# Patient Record
Sex: MALE | Race: BLACK OR AFRICAN AMERICAN | NOT HISPANIC OR LATINO | URBAN - METROPOLITAN AREA
[De-identification: names, ages, dates, MRNs, and addresses within clinical notes are randomized per-mention and may not be internally consistent; named-entity substitution may affect disease eponyms.]

---

## 2017-08-02 ENCOUNTER — HISTORICAL (OUTPATIENT)
Dept: LAB | Facility: HOSPITAL | Age: 22
End: 2017-08-02

## 2022-01-04 ENCOUNTER — PATIENT OUTREACH (OUTPATIENT)
Dept: EMERGENCY MEDICINE | Facility: HOSPITAL | Age: 27
End: 2022-01-04

## 2022-02-01 ENCOUNTER — PATIENT OUTREACH (OUTPATIENT)
Dept: EMERGENCY MEDICINE | Facility: HOSPITAL | Age: 27
End: 2022-02-01

## 2022-04-04 ENCOUNTER — PATIENT OUTREACH (OUTPATIENT)
Dept: EMERGENCY MEDICINE | Facility: HOSPITAL | Age: 27
End: 2022-04-04

## 2022-05-02 ENCOUNTER — PATIENT OUTREACH (OUTPATIENT)
Dept: EMERGENCY MEDICINE | Facility: HOSPITAL | Age: 27
End: 2022-05-02
Payer: MEDICAID

## 2022-06-01 ENCOUNTER — PATIENT OUTREACH (OUTPATIENT)
Dept: EMERGENCY MEDICINE | Facility: HOSPITAL | Age: 27
End: 2022-06-01
Payer: MEDICAID

## 2022-06-02 NOTE — PROGRESS NOTES
Spoke to pt for f/u call, doing well, no compliant. Stressed and advised to pt importance of benefits of pcp and all providers and ancillary care and to call and obtain f/u appt and offer pt to assist with obtaining pcp appt to est care. Pt reports he will do on his own and that he has the list of providers that was given to him from last visit. Stressed importance of obtain dentist for oral care for preventive care. Advised pt to utilize ucc for non emergency issues when pcp is not available. Pt voices understanding to instructions given and appreciation.     Appointments   Follow-Up Appt Scheduled :   No   Follow-Up Appointment Status :   Has not had opportunity to call for appointment   PCP Visit Within Year :   No   Follow-Up Specialist Appt Scheduled :   No

## 2022-09-27 ENCOUNTER — PATIENT OUTREACH (OUTPATIENT)
Dept: EMERGENCY MEDICINE | Facility: HOSPITAL | Age: 27
End: 2022-09-27
Payer: MEDICAID

## 2022-11-01 ENCOUNTER — HOSPITAL ENCOUNTER (EMERGENCY)
Facility: HOSPITAL | Age: 27
Discharge: HOME | End: 2022-11-01
Attending: SPECIALIST
Payer: MEDICAID

## 2022-11-01 VITALS
RESPIRATION RATE: 20 BRPM | DIASTOLIC BLOOD PRESSURE: 79 MMHG | HEART RATE: 80 BPM | WEIGHT: 189 LBS | SYSTOLIC BLOOD PRESSURE: 147 MMHG | OXYGEN SATURATION: 98 % | BODY MASS INDEX: 25.6 KG/M2 | HEIGHT: 72 IN | TEMPERATURE: 98 F

## 2022-11-01 DIAGNOSIS — J02.0 STREP PHARYNGITIS: Primary | ICD-10-CM

## 2022-11-01 LAB
FLUAV AG UPPER RESP QL IA.RAPID: NOT DETECTED
FLUBV AG UPPER RESP QL IA.RAPID: NOT DETECTED
SARS-COV-2 RNA RESP QL NAA+PROBE: NOT DETECTED
STREP A PCR (OHS): DETECTED

## 2022-11-01 PROCEDURE — 25000003 PHARM REV CODE 250: Performed by: SPECIALIST

## 2022-11-01 PROCEDURE — 96372 THER/PROPH/DIAG INJ SC/IM: CPT | Performed by: SPECIALIST

## 2022-11-01 PROCEDURE — 0241U COVID/FLU A&B PCR: CPT | Performed by: SPECIALIST

## 2022-11-01 PROCEDURE — 99284 EMERGENCY DEPT VISIT MOD MDM: CPT

## 2022-11-01 PROCEDURE — 87651 STREP A DNA AMP PROBE: CPT | Performed by: SPECIALIST

## 2022-11-01 PROCEDURE — 63600175 PHARM REV CODE 636 W HCPCS: Mod: JG | Performed by: SPECIALIST

## 2022-11-01 RX ORDER — IBUPROFEN 600 MG/1
600 TABLET ORAL EVERY 6 HOURS PRN
Qty: 20 TABLET | Refills: 0 | Status: SHIPPED | OUTPATIENT
Start: 2022-11-01

## 2022-11-01 RX ORDER — IBUPROFEN 600 MG/1
600 TABLET ORAL
Status: COMPLETED | OUTPATIENT
Start: 2022-11-01 | End: 2022-11-01

## 2022-11-01 RX ADMIN — PENICILLIN G BENZATHINE 1.2 MILLION UNITS: 1200000 INJECTION, SUSPENSION INTRAMUSCULAR at 03:11

## 2022-11-01 RX ADMIN — IBUPROFEN 600 MG: 600 TABLET ORAL at 04:11

## 2022-11-01 NOTE — ED PROVIDER NOTES
"Encounter Date: 11/1/2022       History     Chief Complaint   Patient presents with    Sore Throat     States he woke up suddenly co "the dangly thing in the back of the throat" is swollen and painful. Also painful to swallow, coughing too.     Patient with sore throat, painful to swallow with a slight cough over the last 24 hours; feels feverish    The history is provided by the patient.   Sore Throat   This is a new problem. The sore throat symptoms include difficulty swallowing, sore throat and fever.The current episode started yesterday. The problem has been unchanged.   Review of patient's allergies indicates:  No Known Allergies  No past medical history on file.  No past surgical history on file.  No family history on file.     Review of Systems   Constitutional: Negative.    HENT:  Positive for sore throat.    Respiratory: Negative.     Cardiovascular: Negative.    Gastrointestinal: Negative.    Genitourinary: Negative.    Musculoskeletal: Negative.    Neurological: Negative.      Physical Exam     Initial Vitals [11/01/22 0248]   BP Pulse Resp Temp SpO2   (!) 147/79 80 20 98.4 °F (36.9 °C) 98 %      MAP       --         Physical Exam    Nursing note and vitals reviewed.  Constitutional: He appears well-developed and well-nourished.   HENT:   Head: Normocephalic and atraumatic.   Moderate posterior pharynx and uvula erythema with left tonsillar hypertrophy, no uvular shift or evidence of abscess   Eyes: EOM are normal. Pupils are equal, round, and reactive to light.   Neck: Neck supple. No tracheal deviation present.   Normal range of motion.  Cardiovascular:  Normal rate, regular rhythm and normal heart sounds.           Pulmonary/Chest: Breath sounds normal.   Abdominal: Abdomen is soft.   Musculoskeletal:         General: Normal range of motion.      Cervical back: Normal range of motion and neck supple.     Neurological: He is alert and oriented to person, place, and time.   Skin: Skin is warm and dry. "       ED Course   Procedures  Labs Reviewed   STREP GROUP A BY PCR - Abnormal; Notable for the following components:       Result Value    STREP A PCR ([facility]) Detected (*)     All other components within normal limits    Narrative:     The Xpert Xpress Strep A test is a rapid, qualitative in vitro diagnostic test for the detection of Streptococcus pyogenes (Group A ß-hemolytic Streptococcus, Strep A) in throat swab specimens from patients with signs and symptoms of pharyngitis.     COVID/FLU A&B PCR - Normal    Narrative:     The Xpert Xpress SARS-CoV-2/FLU/RSV plus is a rapid, multiplexed real-time PCR test intended for the simultaneous qualitative detection and differentiation of SARS-CoV-2, Influenza A, Influenza B, and respiratory syncytial virus (RSV) viral RNA in either nasopharyngeal swab or nasal swab specimens.                Imaging Results    None          Medications   ibuprofen tablet 600 mg (has no administration in time range)   penicillin G benzathine (BICILLIN LA) injection 1.2 Million Units (1.2 Million Units Intramuscular Given 11/1/22 3944)                              Clinical Impression:   Final diagnoses:  [J02.0] Strep pharyngitis (Primary)      ED Disposition Condition    Discharge Stable          ED Prescriptions       Medication Sig Dispense Start Date End Date Auth. Provider    ibuprofen (ADVIL,MOTRIN) 600 MG tablet Take 1 tablet (600 mg total) by mouth every 6 (six) hours as needed for Pain. 20 tablet 11/1/2022 -- Vlad Weiss MD          Follow-up Information       Follow up With Specialties Details Why Contact Info    [facility] - Emergency Dept Emergency Medicine  As needed [address] [facility] [address] [phone]             Vlad Weiss MD  11/01/22 8467

## 2022-11-01 NOTE — Clinical Note
"Lisandro Mitchelllxei" Chloe was seen and treated in our emergency department on 11/1/2022.  He may return to work on 11/03/2022.       If you have any questions or concerns, please don't hesitate to call.      elle BUSTILLOS    "

## 2022-11-01 NOTE — Clinical Note
"Lisandro Mitchelllexi" Chloe was seen and treated in our emergency department on 11/1/2022.  He may return to work on 11/03/2022.       If you have any questions or concerns, please don't hesitate to call.      elle BUSTILLOS    "

## 2022-12-22 NOTE — PROGRESS NOTES
Original encounter on date 1/4/2022 in [employer] EMR.  Information placed in EPIC for continuity purposes.                 HealthE Care Entered On:  1/4/2022 11:58 CST    Performed On:  1/4/2022 11:48 CST by Shayy Felipe LPN               Discharge Past 30 Days   Visit to the Hospital in the Last 30 Days :   Emergency department (ED) visit   Reason for Choosing ED for Care :   Could not get primary care appointment   Perception of Change in Health Status DC :   Improving   Discharged To :   Home independently   DC Instructions :   Received discharge instructions , Understands discharge instructions    Education Provided :   ED utilization, Importance of keeping appointments, Community resources, Medication Compliance, Diet Compliance, Other: benefits of pcp and ancillary care, oral care  steps to stop smoking   (Comment: budget friendly healthy eating [Shayy Felipe LPN - 1/4/2022 11:48 CST] )   Discharge Past 30 Days Addntl Comments :   spoke to pt for f/u ED visit. pt reports symptom are improving and he is using rx given from the ED. advised pt to call and obtain pcp appt for est and f/u care, pcp options given to pt and for pt to utilize [facility] for non-emergency until est pcp and when pcp is unavailable, [facility]CC options given to pt. encouraged pt to visit ED for worsening symptoms. discuss medicaiton and budget friendly healthy eating compliance. stressed importance of est pcp and f/u care. pt request to mail sdoh education/resource disscussed, verified pt address with pt.  pt consent to enroll in MCIP and continue f/u calls and voices understanding to instructions given and appreciation.     Shayy Felipe LPN - 1/4/2022 11:48 CST   Barriers to Care   SDoH Eat Less Than You Should :   No   SDoH Shut Off Services to Your Home :   No   SDoH No Regular Place to Live :   No   SDoH Needed Provider but Costs too Much :   No   SDoH Help Reading and Writing Paper Work :   No   SDoH Feel Lonely  Often :   No   SDoH Missed Appt/Meds- No Transportation :   No   SDoH Call Dr To Be Seen Right Away :   No   SDoH Medical Problems Cause ED Visit :   No   SDoH Other Problems Affecting Health :   No   SDoH Reviewed :   Yes   SDoH Dentist Seen in Last Year :   No   (Comment: pt request to mail dental provider options to him [Destinee EMERY Shayybecky Ramos - 1/4/2022 11:48 CST] )   Shayy Felipe LPN - 1/4/2022 11:48 CST   Prescriptions   Medication Reconcilation Completed :   Unknown   Medication Prescriptions Filled After DC :   Confirms all medications filled , Confirms taking medications as prescribed    Questions About Meds Prescribed at DC :   Denies any questions or concerns                    Shayy Felipe LPN - 1/4/2022 11:48 CST   Appointments   Follow-Up Appt Scheduled :   No   Follow-Up Appointment Status :   Has not had opportunity to call for appointment   PCP Visit Within Year :   No   Follow-Up Specialist Appt Scheduled :   No   Shayy Felipe LPN - 1/4/2022 11:48 CST   Navigation   Initial Assesment Completed By :   Phone   ED FIN :   [id number]   MCIP Navigation Call Log :   Initial MCIP contact   Referral To HE Care :   MCIP Navigation   Transportation Arrangements Made :   Yes   Root Causes for High-ED Utilization :   Lack of access to care   Plan: :   Educated patient on process of establishing PCP, Educated on appropriate ED utilization, Educated on alternate means of health care; ie urgent care when PCP not available, Educated on importance of follow up care with PCP, Referred to community resources; ie [place], Educated on importance of follow up preventative dental care with dentist, Budget-friendly health eating education given, Other: mail sdoh education/resource discussed per pt request   Participation in Activity Designed to Address Lack of Annual Ambulatory or Preventative Care Visit? :   Yes   Participation in Activity Designed to Address Avoidable ED  Utilization? :   Yes   During Current Measurement Year, Did Enrollee Receive Education Regarding Outpatient Primary Care Options? :   Yes   During Current Measurement Year, Did the Network Provider Schedule and Appt or Provide a Referral to Enrollee? :   Yes   Education Provided :   Verbal, Written   Shayy Felipe LPN - 1/4/2022 11:48 CST

## 2022-12-22 NOTE — PROGRESS NOTES
Original encounter on date 2/1/2022 in [facility] EMR.  Information placed in EPIC for continuity purposes.  First follow up.                 HealthE Care Entered On:  2/1/2022 12:35 CST    Performed On:  2/1/2022 12:23 CST by Shayy Felipe LPN               Discharge Past 30 Days   Visit to the Hospital in the Last 30 Days :   None   Perception of Change in Health Status DC :   Improving   Education Provided :   ED utilization, Importance of keeping appointments, Community resources, Medication Compliance, Diet Compliance, Other: benefits of pcp and all providers and ancillary care   (Comment: budget friendly healthy eating [Shayy Felipe LPN - 2/1/2022 12:23 CST] )   Discharge Past 30 Days Addntl Comments :   spoke to pt for f/u  call, doing well, no compliants. pt still has not had opportunity to obtain pcp appt, stressed importance of est and f/u care with pcp. offered pt to assist with obtaining pcp appt and stressed importance of attending appt and getting lab work prior to appt. pt wish for assist with obaining new pt appt closer to him in [place]. advised pt will call with update of new pt appt once able to reach out to clinic [facility]. pt voices understanding to instructions given and appreciation.  called Crystal HYATT NP clinic to obtain new pt appt, new pt appt is scheduled for 3/2/22 at 8am with fasting labs prior to appt.  called and notified pt of new pt appt with Crystal HYATT NP on 3/2/22 at 8am with fasting labs to be done prior to appt and for pt to get lab work done fasting at [facility] lab at least 5 to 7 days prior to appt. pt request to mail appt letter with appt info.   mailed appt letter to pt per pt request, verified pt address with pt.     Shayy Felipe LPN - 2/1/2022 12:23 CST   Barriers to Care   SDoH Eat Less Than You Should :   No   SDoH Shut Off Services to Your Home :   No   SDoH No Regular Place to Live :    No   SDoH Needed Provider but Costs too Much :   No   SDoH Help Reading and Writing Paper Work :   No   SDoH Feel Lonely Often :   No   SDoH Missed Appt/Meds- No Transportation :   No   SDoH Call Dr To Be Seen Right Away :   No   SDoH Medical Problems Cause ED Visit :   No   SDoH Other Problems Affecting Health :   No   SDoH Reviewed :   Yes   SDoH Dentist Seen in Last Year :   Meredith   Destinee EMERYShayy Richard - 2/1/2022 12:23 CST   Appointments   Follow-Up Appt Scheduled :   Yes   Follow-Up Provider Appt Scheduled By :   [facility] navigator   PCP Name :   obtain new pt appt with Crystal Coelho NP with [facility]   Follow-Up Date :   3/2/2022 CST   Follow-Up Appointment Status :   Confirms scheduled appointment    PCP Visit Within Year :   [place] Felipe LPN Shayy Ramos - 2/1/2022 12:23 CST   Navigation   Initial Assesment Completed By :   Phone   ED FIN :   [id number]   MCIP Navigation Call Log :   First follow up call   Transportation Arrangements Made :   Yes   Root Causes for High-ED Utilization :   Lack of access to care   Plan: :   Educated patient on process of establishing PCP, Educated on appropriate ED utilization, Educated on alternate means of health care; ie urgent care when PCP not available, Educated on importance of follow up care with PCP, Set up appointment, Educated on importance of follow up preventative dental care with dentist, Budget-friendly health eating education given, Other: mail appt letter to pt per pt request   Participation in Activity Designed to Address Lack of Annual Ambulatory or Preventative Care Visit? :   Yes   Participation in Activity Designed to Address Avoidable ED Utilization? :   Yes   During Current Measurement Year, Did Enrollee Receive Education Regarding Outpatient Primary Care Options? :   Yes   During Current Measurement Year, Did Enrollee Receive an Appt Reminder 24-48 Hours Before a Scheduled Appt? :   Yes   During Current Measurement Year, Did  the Network Provider Schedule and Appt or Provide a Referral to Enrollee? :   Yes   Education Provided :   Verbal, Written   Shayy Felipe LPN - 2/1/2022 12:23 CST

## 2022-12-22 NOTE — PROGRESS NOTES
Original encounter on date 4/4/2022 in [employer] EMR.  Information placed in EPIC for continuity purposes.  Second follow up.                 HealthE Care Entered On:  4/4/2022 9:35 CDT    Performed On:  4/4/2022 9:29 CDT by Shayy Felipe LPN               Discharge Past 30 Days   Visit to the Hospital in the Last 30 Days :   None   Perception of Change in Health Status DC :   Improving   Education Provided :   ED utilization, Importance of keeping appointments, Community resources, Diet Compliance, Other: benefits of pcp and all providers and ancillacy care, importance of est pcp and attending appts   (Comment:  budget friendly healthy eating [Shayy Felipe LPN - 4/4/2022 9:29 CDT] )   Discharge Past 30 Days Addntl Comments :   spoke to pt for f/u call, doing well. discussed missed appt to est pcp and stressed importance of obtaining pcp and est care and follow up care. gave [facility] call and obtain new pt appt to est care. advised pt to utilize ucc for non-emergency issues when pcp is not available. pt voices understanding to instructions given and appreciation.     Patient pregnant :   N/A   Shayy Felipe LPN - 4/4/2022 9:29 CDT   Barriers to Care   SDoH Eat Less Than You Should :   No   SDoH Shut Off Services to Your Home :   No   SDoH No Regular Place to Live :   No   SDoH Needed Provider but Costs too Much :   No   SDoH Help Reading and Writing Paper Work :   No   SDoH Feel Lonely Often :   No   SDoH Missed Appt/Meds- No Transportation :   No   SDoH Call Dr To Be Seen Right Away :   No   SDoH Medical Problems Cause ED Visit :   No   SDoH Other Problems Affecting Health :   No   SDoH Reviewed :   Yes   SDoH Dentist Seen in Last Year :   No   Shayy Felipe LPN - 4/4/2022 9:29 CDT   Appointments   Follow-Up Appt Scheduled :   No   Follow-Up Appointment Status :   Has not had opportunity to call for appointment   PCP Visit Within Year :    No   Follow-Up Specialist Appt Scheduled :   No   Shayy Felipe LPN - 4/4/2022 9:29 CDT   Navigation   Initial Assesment Completed By :   Phone   ED FIN :   [id number]   [facility] Navigation Call Log :   Second follow up call   Transportation Arrangements Made :   Yes   Root Causes for High-ED Utilization :   Lack of access to care   Plan: :   Educated patient on process of establishing PCP, Educated on appropriate ED utilization, Educated on alternate means of health care; ie urgent care when PCP not available, Educated on importance of follow up care with PCP, Referred to community resources; ie [place], Educated on importance of follow up preventative dental care with dentist, Budget-friendly health eating education given   Participation in Activity Designed to Address Lack of Annual Ambulatory or Preventative Care Visit? :   Yes   Participation in Activity Designed to Address Avoidable ED Utilization? :   Yes   During Current Measurement Year, Did Enrollee Receive Education Regarding Outpatient Primary Care Options? :   Yes   During Current Measurement Year, Did Enrollee Receive an Appt Reminder 24-48 Hours Before a Scheduled Appt? :   Yes   During Current Measurement Year, Did the Network Provider Schedule and Appt or Provide a Referral to Enrollee? :   Yes   Education Provided :   Verbal   Shayy Felipe LPN - 4/4/2022 9:29 CDT

## 2023-10-03 ENCOUNTER — HOSPITAL ENCOUNTER (EMERGENCY)
Facility: HOSPITAL | Age: 28
Discharge: HOME | End: 2023-10-03
Attending: EMERGENCY MEDICINE

## 2023-10-03 VITALS
OXYGEN SATURATION: 98 % | BODY MASS INDEX: 27.44 KG/M2 | HEIGHT: 71 IN | DIASTOLIC BLOOD PRESSURE: 92 MMHG | SYSTOLIC BLOOD PRESSURE: 143 MMHG | HEART RATE: 72 BPM | TEMPERATURE: 98 F | WEIGHT: 196 LBS | RESPIRATION RATE: 18 BRPM

## 2023-10-03 DIAGNOSIS — M25.579 ANKLE PAIN: ICD-10-CM

## 2023-10-03 DIAGNOSIS — M25.572 ACUTE LEFT ANKLE PAIN: Primary | ICD-10-CM

## 2023-10-03 PROCEDURE — 99283 EMERGENCY DEPT VISIT LOW MDM: CPT

## 2023-10-03 NOTE — DISCHARGE INSTRUCTIONS
Patient will be discharged home.  Patient is able to return to work without any restrictions follow up with primary care provider as needed.
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
## 2023-10-03 NOTE — Clinical Note
"Lisandro"Jv Corona was seen and treated in our emergency department on 10/3/2023.  He may return to work on 10/04/2023.       If you have any questions or concerns, please don't hesitate to call.      Severiano Gilliam MD"

## 2024-08-09 ENCOUNTER — HOSPITAL ENCOUNTER (EMERGENCY)
Facility: HOSPITAL | Age: 29
Discharge: HOME | End: 2024-08-09
Attending: FAMILY MEDICINE
Payer: COMMERCIAL

## 2024-08-09 VITALS
DIASTOLIC BLOOD PRESSURE: 79 MMHG | WEIGHT: 186 LBS | SYSTOLIC BLOOD PRESSURE: 145 MMHG | BODY MASS INDEX: 25.19 KG/M2 | RESPIRATION RATE: 18 BRPM | OXYGEN SATURATION: 97 % | TEMPERATURE: 99 F | HEIGHT: 72 IN | HEART RATE: 65 BPM

## 2024-08-09 DIAGNOSIS — J01.90 ACUTE BACTERIAL SINUSITIS: Primary | ICD-10-CM

## 2024-08-09 DIAGNOSIS — B96.89 ACUTE BACTERIAL SINUSITIS: Primary | ICD-10-CM

## 2024-08-09 LAB
FLUAV AG UPPER RESP QL IA.RAPID: NOT DETECTED
FLUBV AG UPPER RESP QL IA.RAPID: NOT DETECTED
SARS-COV-2 RNA RESP QL NAA+PROBE: NOT DETECTED

## 2024-08-09 PROCEDURE — 0240U COVID/FLU A&B PCR: CPT | Performed by: FAMILY MEDICINE

## 2024-08-09 PROCEDURE — 99283 EMERGENCY DEPT VISIT LOW MDM: CPT

## 2024-08-09 RX ORDER — AMOXICILLIN AND CLAVULANATE POTASSIUM 875; 125 MG/1; MG/1
1 TABLET, FILM COATED ORAL 2 TIMES DAILY
Qty: 14 TABLET | Refills: 0 | Status: SHIPPED | OUTPATIENT
Start: 2024-08-09

## 2024-08-09 RX ORDER — IBUPROFEN 800 MG/1
800 TABLET ORAL EVERY 6 HOURS PRN
Qty: 20 TABLET | Refills: 0 | Status: SHIPPED | OUTPATIENT
Start: 2024-08-09

## 2024-08-09 NOTE — ED PROVIDER NOTES
Encounter Date: 8/9/2024       History     Chief Complaint   Patient presents with    URI     Complains of coughing and congestion x 3 weeks     29-year-old presents complaining of some nasal congestion sinus pain for about 3 weeks not getting any better no fevers no chills dry nonproductive cough just a lot of sinus pain and pressure now having pain in the upper teeth physical exam was unremarkable some sinus tenderness vital signs are stable        Review of patient's allergies indicates:   Allergen Reactions    Iodinated contrast media      History reviewed. No pertinent past medical history.  History reviewed. No pertinent surgical history.  No family history on file.     Review of Systems   Constitutional:  Negative for fever.   HENT:  Positive for congestion, sinus pressure and sinus pain. Negative for sore throat.    Respiratory:  Negative for shortness of breath.    Cardiovascular:  Negative for chest pain.   Gastrointestinal:  Negative for nausea.   Genitourinary:  Negative for dysuria.   Musculoskeletal:  Negative for back pain.   Skin:  Negative for rash.   Neurological:  Negative for weakness.   Hematological:  Does not bruise/bleed easily.   All other systems reviewed and are negative.      Physical Exam     Initial Vitals [08/09/24 0933]   BP Pulse Resp Temp SpO2   (!) 145/79 65 18 98.6 °F (37 °C) 97 %      MAP       --         Physical Exam    Nursing note and vitals reviewed.  Constitutional: He appears well-developed and well-nourished. He is active.   HENT:   Head: Normocephalic and atraumatic.   Nose: Nose normal.   Mouth/Throat: Oropharynx is clear and moist.   Eyes: Conjunctivae, EOM and lids are normal. Pupils are equal, round, and reactive to light.   Neck: Trachea normal and phonation normal. Neck supple. No thyroid mass present.   Normal range of motion.  Cardiovascular:  Normal rate, regular rhythm, normal heart sounds and normal pulses.           Pulmonary/Chest: Breath sounds normal.    Abdominal: Bowel sounds are normal.   Musculoskeletal:         General: Normal range of motion.      Cervical back: Normal range of motion and neck supple.     Neurological: He is alert and oriented to person, place, and time. He has normal strength and normal reflexes.   Skin: Skin is warm and intact.   Psychiatric: He has a normal mood and affect. His speech is normal and behavior is normal. Judgment and thought content normal. Cognition and memory are normal.         ED Course   Procedures  Labs Reviewed   COVID/FLU A&B PCR - Normal       Result Value    Influenza A PCR Not Detected      Influenza B PCR Not Detected      SARS-CoV-2 PCR Not Detected      Narrative:     The Xpert Xpress SARS-CoV-2/FLU/RSV plus is a rapid, multiplexed real-time PCR test intended for the simultaneous qualitative detection and differentiation of SARS-CoV-2, Influenza A, Influenza B, and respiratory syncytial virus (RSV) viral RNA in either nasopharyngeal swab or nasal swab specimens.                Imaging Results    None          Medications - No data to display  Medical Decision Making  29-year-old presents complaining of some nasal congestion sinus pain for about 3 weeks not getting any better no fevers no chills dry nonproductive cough just a lot of sinus pain and pressure now having pain in the upper teeth physical exam was unremarkable some sinus tenderness vital signs are stable      Discussed findings and treatment plan with the patient    Amount and/or Complexity of Data Reviewed  Labs: ordered. Decision-making details documented in ED Course.    Risk  Prescription drug management.  Risk Details: Differential diagnosis COVID flu RSV viral syndrome bacterial sinusitis               ED Course as of 08/09/24 1021   Fri Aug 09, 2024   1019 SARS-CoV2 (COVID-19) Qualitative PCR: Not Detected [BL]   1019 Influenza B, Molecular: Not Detected [BL]   1019 Influenza A, Molecular: Not Detected [BL]      ED Course User Index  [BL]  Nico Christie MD                           Clinical Impression:  Final diagnoses:  [J01.90, B96.89] Acute bacterial sinusitis (Primary)          ED Disposition Condition    Discharge Stable          ED Prescriptions       Medication Sig Dispense Start Date End Date Auth. Provider    amoxicillin-clavulanate 875-125mg (AUGMENTIN) 875-125 mg per tablet Take 1 tablet by mouth 2 (two) times daily. 14 tablet 8/9/2024 -- Nico Christie MD    ibuprofen (ADVIL,MOTRIN) 800 MG tablet Take 1 tablet (800 mg total) by mouth every 6 (six) hours as needed for Pain. 20 tablet 8/9/2024 -- Nico Christie MD          Follow-up Information       Follow up With Specialties Details Why Contact Info    [facility] - Emergency Dept Emergency Medicine  As needed [address] [facility] [address] [phone]             Nico Christie MD  08/09/24 4089